# Patient Record
Sex: FEMALE | Race: WHITE | Employment: OTHER | URBAN - METROPOLITAN AREA
[De-identification: names, ages, dates, MRNs, and addresses within clinical notes are randomized per-mention and may not be internally consistent; named-entity substitution may affect disease eponyms.]

---

## 2017-08-29 PROBLEM — E66.01 SEVERE OBESITY (BMI 35.0-39.9) WITH COMORBIDITY (HCC): Status: ACTIVE | Noted: 2017-08-29

## 2017-08-29 PROBLEM — I70.0 AORTIC ATHEROSCLEROSIS (HCC): Status: ACTIVE | Noted: 2017-08-29

## 2017-10-10 PROCEDURE — 81001 URINALYSIS AUTO W/SCOPE: CPT | Performed by: FAMILY MEDICINE

## 2017-10-18 PROBLEM — M17.11 PRIMARY OSTEOARTHRITIS OF RIGHT KNEE: Status: ACTIVE | Noted: 2017-10-18

## 2018-02-23 PROBLEM — M94.261 CHONDROMALACIA OF RIGHT KNEE: Status: ACTIVE | Noted: 2018-02-23

## 2018-02-23 PROBLEM — M23.91 INTERNAL DERANGEMENT OF KNEE, RIGHT: Status: ACTIVE | Noted: 2018-02-23

## 2018-04-16 PROBLEM — N95.2 ATROPHIC VAGINITIS: Status: ACTIVE | Noted: 2018-04-16

## 2018-04-16 PROCEDURE — 87510 GARDNER VAG DNA DIR PROBE: CPT | Performed by: FAMILY MEDICINE

## 2018-04-16 PROCEDURE — 87480 CANDIDA DNA DIR PROBE: CPT | Performed by: FAMILY MEDICINE

## 2018-04-16 PROCEDURE — 87660 TRICHOMONAS VAGIN DIR PROBE: CPT | Performed by: FAMILY MEDICINE

## 2018-04-23 PROBLEM — N95.0 POSTMENOPAUSE BLEEDING: Status: ACTIVE | Noted: 2018-04-23

## 2018-04-23 PROBLEM — R93.89 ENDOMETRIAL THICKENING ON ULTRASOUND: Status: ACTIVE | Noted: 2018-04-23

## 2018-05-15 PROCEDURE — 88305 TISSUE EXAM BY PATHOLOGIST: CPT | Performed by: OBSTETRICS & GYNECOLOGY

## 2018-05-15 PROCEDURE — 88175 CYTOPATH C/V AUTO FLUID REDO: CPT | Performed by: OBSTETRICS & GYNECOLOGY

## 2018-05-22 PROCEDURE — 88305 TISSUE EXAM BY PATHOLOGIST: CPT | Performed by: OBSTETRICS & GYNECOLOGY

## 2018-07-26 PROBLEM — R93.89 ENDOMETRIAL THICKENING ON ULTRASOUND: Status: RESOLVED | Noted: 2018-04-23 | Resolved: 2018-07-26

## 2018-08-03 PROCEDURE — 81001 URINALYSIS AUTO W/SCOPE: CPT | Performed by: FAMILY MEDICINE

## 2019-02-28 PROBLEM — D03.61 MELANOMA IN SITU OF RIGHT UPPER EXTREMITY (HCC): Status: ACTIVE | Noted: 2019-02-28

## 2019-03-12 PROCEDURE — 88305 TISSUE EXAM BY PATHOLOGIST: CPT | Performed by: SURGERY

## 2019-06-18 PROBLEM — M87.9 OSTEONECROSIS OF RIGHT KNEE REGION (HCC): Status: ACTIVE | Noted: 2019-06-18

## 2019-08-12 PROCEDURE — 88342 IMHCHEM/IMCYTCHM 1ST ANTB: CPT

## 2019-08-15 ENCOUNTER — APPOINTMENT (OUTPATIENT)
Dept: LAB | Age: 69
End: 2019-08-15
Attending: PHYSICIAN ASSISTANT
Payer: MEDICARE

## 2019-08-15 DIAGNOSIS — D48.5 NEOPLASM OF UNCERTAIN BEHAVIOR OF SKIN: ICD-10-CM

## 2019-09-11 PROCEDURE — 81003 URINALYSIS AUTO W/O SCOPE: CPT | Performed by: FAMILY MEDICINE

## 2019-09-16 ENCOUNTER — APPOINTMENT (OUTPATIENT)
Dept: LAB | Facility: HOSPITAL | Age: 69
End: 2019-09-16
Payer: MEDICARE

## 2019-09-16 ENCOUNTER — LABORATORY ENCOUNTER (OUTPATIENT)
Dept: LAB | Facility: HOSPITAL | Age: 69
End: 2019-09-16
Payer: MEDICARE

## 2019-09-16 ENCOUNTER — HOSPITAL ENCOUNTER (OUTPATIENT)
Dept: PHYSICAL THERAPY | Facility: HOSPITAL | Age: 69
Discharge: HOME | End: 2019-09-16
Payer: MEDICARE

## 2019-09-16 DIAGNOSIS — M17.11 PRIMARY OSTEOARTHRITIS OF RIGHT KNEE: ICD-10-CM

## 2019-09-16 LAB
ANTIBODY SCREEN: NEGATIVE
RH BLOOD TYPE: POSITIVE

## 2019-09-16 PROCEDURE — 93005 ELECTROCARDIOGRAM TRACING: CPT

## 2019-09-16 PROCEDURE — 87081 CULTURE SCREEN ONLY: CPT

## 2019-09-16 PROCEDURE — 93010 ELECTROCARDIOGRAM REPORT: CPT | Performed by: INTERNAL MEDICINE

## 2019-09-16 PROCEDURE — 86850 RBC ANTIBODY SCREEN: CPT

## 2019-09-16 PROCEDURE — 86901 BLOOD TYPING SEROLOGIC RH(D): CPT

## 2019-09-16 PROCEDURE — 86900 BLOOD TYPING SEROLOGIC ABO: CPT

## 2019-09-17 LAB
ATRIAL RATE: 71 BPM
P AXIS: 59 DEGREES
P-R INTERVAL: 172 MS
Q-T INTERVAL: 438 MS
QRS DURATION: 92 MS
QTC CALCULATION (BEZET): 475 MS
R AXIS: -17 DEGREES
T AXIS: 50 DEGREES
VENTRICULAR RATE: 71 BPM

## 2019-10-03 ENCOUNTER — ANESTHESIA (OUTPATIENT)
Dept: SURGERY | Facility: HOSPITAL | Age: 69
End: 2019-10-03
Payer: MEDICARE

## 2019-10-03 ENCOUNTER — ANESTHESIA EVENT (OUTPATIENT)
Dept: SURGERY | Facility: HOSPITAL | Age: 69
End: 2019-10-03
Payer: MEDICARE

## 2019-10-03 ENCOUNTER — APPOINTMENT (OUTPATIENT)
Dept: GENERAL RADIOLOGY | Facility: HOSPITAL | Age: 69
End: 2019-10-03
Attending: PHYSICIAN ASSISTANT
Payer: MEDICARE

## 2019-10-03 ENCOUNTER — HOSPITAL ENCOUNTER (OUTPATIENT)
Facility: HOSPITAL | Age: 69
Discharge: HOME HEALTH | End: 2019-10-04
Attending: ORTHOPAEDIC SURGERY | Admitting: ORTHOPAEDIC SURGERY
Payer: MEDICARE

## 2019-10-03 DIAGNOSIS — M17.11 PRIMARY OSTEOARTHRITIS OF RIGHT KNEE: Primary | ICD-10-CM

## 2019-10-03 PROCEDURE — 88305 TISSUE EXAM BY PATHOLOGIST: CPT | Performed by: ORTHOPAEDIC SURGERY

## 2019-10-03 PROCEDURE — 0SRC069 REPLACEMENT OF RIGHT KNEE JOINT WITH OXIDIZED ZIRCONIUM ON POLYETHYLENE SYNTHETIC SUBSTITUTE, CEMENTED, OPEN APPROACH: ICD-10-PCS | Performed by: ORTHOPAEDIC SURGERY

## 2019-10-03 PROCEDURE — 97116 GAIT TRAINING THERAPY: CPT

## 2019-10-03 PROCEDURE — 97161 PT EVAL LOW COMPLEX 20 MIN: CPT

## 2019-10-03 PROCEDURE — 88311 DECALCIFY TISSUE: CPT | Performed by: ORTHOPAEDIC SURGERY

## 2019-10-03 PROCEDURE — 76942 ECHO GUIDE FOR BIOPSY: CPT | Performed by: ORTHOPAEDIC SURGERY

## 2019-10-03 PROCEDURE — 73560 X-RAY EXAM OF KNEE 1 OR 2: CPT | Performed by: PHYSICIAN ASSISTANT

## 2019-10-03 DEVICE — ATTUNE KNEE SYSTEM TIBIAL INSERT ROTATING PLATFORM POSTERIOR STABILIZED 6 6MM AOX
Type: IMPLANTABLE DEVICE | Site: KNEE | Status: FUNCTIONAL
Brand: ATTUNE

## 2019-10-03 DEVICE — SMARTSET GHV GENTAMICIN HIGH VISCOSITY BONE CEMENT 40G
Type: IMPLANTABLE DEVICE | Site: KNEE | Status: FUNCTIONAL
Brand: SMARTSET

## 2019-10-03 DEVICE — ATTUNE PATELLA MEDIALIZED ANATOMIC 35MM CEMENTED AOX
Type: IMPLANTABLE DEVICE | Site: KNEE | Status: FUNCTIONAL
Brand: ATTUNE

## 2019-10-03 DEVICE — ATTUNE KNEE SYSTEM FEMORAL POSTERIOR STABILIZED NARROW SIZE 6N RIGHT CEMENTED
Type: IMPLANTABLE DEVICE | Site: KNEE | Status: FUNCTIONAL
Brand: ATTUNE

## 2019-10-03 DEVICE — ATTUNE KNEE SYSTEM TIBIAL BASE ROTATING PLATFORM SIZE 4 CEMENTED
Type: IMPLANTABLE DEVICE | Site: KNEE | Status: FUNCTIONAL
Brand: ATTUNE

## 2019-10-03 RX ORDER — HYDROMORPHONE HYDROCHLORIDE 1 MG/ML
0.2 INJECTION, SOLUTION INTRAMUSCULAR; INTRAVENOUS; SUBCUTANEOUS EVERY 2 HOUR PRN
Status: DISCONTINUED | OUTPATIENT
Start: 2019-10-03 | End: 2019-10-04

## 2019-10-03 RX ORDER — DIPHENHYDRAMINE HYDROCHLORIDE 50 MG/ML
25 INJECTION INTRAMUSCULAR; INTRAVENOUS ONCE AS NEEDED
Status: ACTIVE | OUTPATIENT
Start: 2019-10-03 | End: 2019-10-03

## 2019-10-03 RX ORDER — DIPHENHYDRAMINE HCL 25 MG
25 CAPSULE ORAL EVERY 4 HOURS PRN
Status: DISCONTINUED | OUTPATIENT
Start: 2019-10-03 | End: 2019-10-04

## 2019-10-03 RX ORDER — KETOROLAC TROMETHAMINE 15 MG/ML
15 INJECTION, SOLUTION INTRAMUSCULAR; INTRAVENOUS EVERY 6 HOURS
Status: COMPLETED | OUTPATIENT
Start: 2019-10-03 | End: 2019-10-04

## 2019-10-03 RX ORDER — ZOLPIDEM TARTRATE 5 MG/1
5 TABLET ORAL NIGHTLY PRN
Status: DISCONTINUED | OUTPATIENT
Start: 2019-10-03 | End: 2019-10-04

## 2019-10-03 RX ORDER — SODIUM PHOSPHATE, DIBASIC AND SODIUM PHOSPHATE, MONOBASIC 7; 19 G/133ML; G/133ML
1 ENEMA RECTAL ONCE AS NEEDED
Status: DISCONTINUED | OUTPATIENT
Start: 2019-10-03 | End: 2019-10-04

## 2019-10-03 RX ORDER — NALOXONE HYDROCHLORIDE 0.4 MG/ML
80 INJECTION, SOLUTION INTRAMUSCULAR; INTRAVENOUS; SUBCUTANEOUS AS NEEDED
Status: DISCONTINUED | OUTPATIENT
Start: 2019-10-03 | End: 2019-10-03 | Stop reason: HOSPADM

## 2019-10-03 RX ORDER — PROCHLORPERAZINE EDISYLATE 5 MG/ML
10 INJECTION INTRAMUSCULAR; INTRAVENOUS EVERY 6 HOURS PRN
Status: DISCONTINUED | OUTPATIENT
Start: 2019-10-03 | End: 2019-10-04

## 2019-10-03 RX ORDER — TIZANIDINE 2 MG/1
2 TABLET ORAL 3 TIMES DAILY PRN
Status: DISCONTINUED | OUTPATIENT
Start: 2019-10-03 | End: 2019-10-04

## 2019-10-03 RX ORDER — ACETAMINOPHEN 500 MG
1000 TABLET ORAL ONCE
Status: DISCONTINUED | OUTPATIENT
Start: 2019-10-03 | End: 2019-10-03 | Stop reason: HOSPADM

## 2019-10-03 RX ORDER — HYDROMORPHONE HYDROCHLORIDE 1 MG/ML
0.4 INJECTION, SOLUTION INTRAMUSCULAR; INTRAVENOUS; SUBCUTANEOUS EVERY 2 HOUR PRN
Status: DISCONTINUED | OUTPATIENT
Start: 2019-10-03 | End: 2019-10-04

## 2019-10-03 RX ORDER — ACETAMINOPHEN 325 MG/1
650 TABLET ORAL 4 TIMES DAILY
Status: DISCONTINUED | OUTPATIENT
Start: 2019-10-03 | End: 2019-10-04

## 2019-10-03 RX ORDER — HYDROMORPHONE HYDROCHLORIDE 1 MG/ML
0.4 INJECTION, SOLUTION INTRAMUSCULAR; INTRAVENOUS; SUBCUTANEOUS EVERY 5 MIN PRN
Status: DISCONTINUED | OUTPATIENT
Start: 2019-10-03 | End: 2019-10-03 | Stop reason: HOSPADM

## 2019-10-03 RX ORDER — BISACODYL 10 MG
10 SUPPOSITORY, RECTAL RECTAL
Status: DISCONTINUED | OUTPATIENT
Start: 2019-10-03 | End: 2019-10-04

## 2019-10-03 RX ORDER — ONDANSETRON 2 MG/ML
INJECTION INTRAMUSCULAR; INTRAVENOUS
Status: COMPLETED
Start: 2019-10-03 | End: 2019-10-03

## 2019-10-03 RX ORDER — ONDANSETRON 2 MG/ML
4 INJECTION INTRAMUSCULAR; INTRAVENOUS EVERY 4 HOURS PRN
Status: DISCONTINUED | OUTPATIENT
Start: 2019-10-03 | End: 2019-10-04

## 2019-10-03 RX ORDER — MELATONIN
325
Status: DISCONTINUED | OUTPATIENT
Start: 2019-10-04 | End: 2019-10-04

## 2019-10-03 RX ORDER — METOCLOPRAMIDE HYDROCHLORIDE 5 MG/ML
10 INJECTION INTRAMUSCULAR; INTRAVENOUS EVERY 6 HOURS PRN
Status: DISCONTINUED | OUTPATIENT
Start: 2019-10-03 | End: 2019-10-04

## 2019-10-03 RX ORDER — ATORVASTATIN CALCIUM 10 MG/1
10 TABLET, FILM COATED ORAL NIGHTLY
Status: DISCONTINUED | OUTPATIENT
Start: 2019-10-03 | End: 2019-10-04

## 2019-10-03 RX ORDER — SODIUM CHLORIDE, SODIUM LACTATE, POTASSIUM CHLORIDE, CALCIUM CHLORIDE 600; 310; 30; 20 MG/100ML; MG/100ML; MG/100ML; MG/100ML
INJECTION, SOLUTION INTRAVENOUS CONTINUOUS
Status: DISCONTINUED | OUTPATIENT
Start: 2019-10-03 | End: 2019-10-03

## 2019-10-03 RX ORDER — DIPHENHYDRAMINE HYDROCHLORIDE 50 MG/ML
12.5 INJECTION INTRAMUSCULAR; INTRAVENOUS EVERY 4 HOURS PRN
Status: DISCONTINUED | OUTPATIENT
Start: 2019-10-03 | End: 2019-10-04

## 2019-10-03 RX ORDER — ACETAMINOPHEN 325 MG/1
TABLET ORAL
Status: COMPLETED
Start: 2019-10-03 | End: 2019-10-03

## 2019-10-03 RX ORDER — POLYETHYLENE GLYCOL 3350 17 G/17G
17 POWDER, FOR SOLUTION ORAL DAILY PRN
Status: DISCONTINUED | OUTPATIENT
Start: 2019-10-03 | End: 2019-10-04

## 2019-10-03 RX ORDER — SODIUM CHLORIDE, SODIUM LACTATE, POTASSIUM CHLORIDE, CALCIUM CHLORIDE 600; 310; 30; 20 MG/100ML; MG/100ML; MG/100ML; MG/100ML
INJECTION, SOLUTION INTRAVENOUS CONTINUOUS
Status: DISCONTINUED | OUTPATIENT
Start: 2019-10-03 | End: 2019-10-03 | Stop reason: HOSPADM

## 2019-10-03 RX ORDER — OXYCODONE HYDROCHLORIDE 5 MG/1
5 TABLET ORAL EVERY 4 HOURS PRN
Status: DISCONTINUED | OUTPATIENT
Start: 2019-10-03 | End: 2019-10-04

## 2019-10-03 RX ORDER — HYDROMORPHONE HYDROCHLORIDE 1 MG/ML
0.8 INJECTION, SOLUTION INTRAMUSCULAR; INTRAVENOUS; SUBCUTANEOUS EVERY 2 HOUR PRN
Status: DISCONTINUED | OUTPATIENT
Start: 2019-10-03 | End: 2019-10-04

## 2019-10-03 RX ORDER — CLINDAMYCIN PHOSPHATE 900 MG/50ML
900 INJECTION INTRAVENOUS ONCE
Status: COMPLETED | OUTPATIENT
Start: 2019-10-03 | End: 2019-10-04

## 2019-10-03 RX ORDER — OXYCODONE HYDROCHLORIDE 15 MG/1
15 TABLET ORAL EVERY 4 HOURS PRN
Status: DISCONTINUED | OUTPATIENT
Start: 2019-10-03 | End: 2019-10-04

## 2019-10-03 RX ORDER — CLINDAMYCIN PHOSPHATE 900 MG/50ML
900 INJECTION INTRAVENOUS EVERY 8 HOURS
Status: COMPLETED | OUTPATIENT
Start: 2019-10-03 | End: 2019-10-04

## 2019-10-03 RX ORDER — ACETAMINOPHEN 325 MG/1
650 TABLET ORAL ONCE
Status: COMPLETED | OUTPATIENT
Start: 2019-10-03 | End: 2019-10-03

## 2019-10-03 RX ORDER — TRAMADOL HYDROCHLORIDE 50 MG/1
50 TABLET ORAL EVERY 12 HOURS
Status: DISCONTINUED | OUTPATIENT
Start: 2019-10-03 | End: 2019-10-04

## 2019-10-03 RX ORDER — OXYCODONE HYDROCHLORIDE 10 MG/1
10 TABLET ORAL EVERY 4 HOURS PRN
Status: DISCONTINUED | OUTPATIENT
Start: 2019-10-03 | End: 2019-10-04

## 2019-10-03 RX ORDER — LISINOPRIL AND HYDROCHLOROTHIAZIDE 12.5; 1 MG/1; MG/1
1 TABLET ORAL DAILY
Status: DISCONTINUED | OUTPATIENT
Start: 2019-10-04 | End: 2019-10-03 | Stop reason: SDUPTHER

## 2019-10-03 RX ORDER — DOCUSATE SODIUM 100 MG/1
100 CAPSULE, LIQUID FILLED ORAL 2 TIMES DAILY
Status: DISCONTINUED | OUTPATIENT
Start: 2019-10-03 | End: 2019-10-04

## 2019-10-03 RX ORDER — SODIUM CHLORIDE 9 MG/ML
INJECTION, SOLUTION INTRAVENOUS CONTINUOUS
Status: DISCONTINUED | OUTPATIENT
Start: 2019-10-03 | End: 2019-10-04

## 2019-10-03 RX ORDER — SENNOSIDES 8.6 MG
17.2 TABLET ORAL NIGHTLY
Status: DISCONTINUED | OUTPATIENT
Start: 2019-10-03 | End: 2019-10-04

## 2019-10-03 RX ORDER — ONDANSETRON 2 MG/ML
4 INJECTION INTRAMUSCULAR; INTRAVENOUS AS NEEDED
Status: DISCONTINUED | OUTPATIENT
Start: 2019-10-03 | End: 2019-10-03 | Stop reason: HOSPADM

## 2019-10-03 NOTE — ANESTHESIA PREPROCEDURE EVALUATION
PRE-OP EVALUATION    Patient Name: Kristine Morrison    Pre-op Diagnosis: Primary osteoarthritis of right knee [M17.11]    Procedure(s):  RIGHT TOTAL KNEE ARTHROPLASTY    Surgeon(s) and Role:     Mica Elizabeth MD - Primary    Pre-op vitals reviewed.   Floresita Whitehead reviewed. (+) hypertension   (+) hyperlipidemia                                  Endo/Other    Negative endo/other ROS.                               Pulmonary  Comment: Histoplasmosis, pulmonary nodules                         Neuro/Psych    Neg history. Pulmonary    Pulmonary exam normal.  Breath sounds clear to auscultation bilaterally.                Other findings            ASA: 2   Plan: spinal and regional  NPO status verified and     Post-procedure pain management plan discussed wit

## 2019-10-03 NOTE — INTERVAL H&P NOTE
Pre-op Diagnosis: Primary osteoarthritis of right knee [M17.11]    The above referenced H&P was reviewed by Cynthia Camacho MD on 10/3/2019, the patient was examined and no significant changes have occurred in the patient's condition since the H&P was per

## 2019-10-03 NOTE — PLAN OF CARE
Received pt from pacu at 1300. C/o nausea and pain. Reglan, oxy and IV dilaudid given as per prn orders. Relief obtained. Pt was able to eat small amt lunch and then slept. Currently working with therapy.   Pt and spouse verbalized understanding of POC

## 2019-10-03 NOTE — H&P
Estefani Braden   9/11/2019 9:30 AM   Office Visit   MRN:  [id number]   Description: 68year old female Provider: Cherelle Enamorado MD Department: [address] [place] [address]   Scanning Cover Sheet     Click to print Barcode Encounter Cover Sheet for scanning   Off HYDROcodone-acetaminophen (NORCO) 5-325 MG Oral Tab Take 1 tablet by mouth every 4 (four) hours as needed for Pain. May limit to bedtime use.  Disp: 20 tablet Rfl: 0   mupirocin 2 % External Ointment Apply to biopsy site BID prn Disp: 30 g Rfl: 0   fluorour   Performed by Lenora Multani MD at [address] [place]   10/2/09     EBCT score 0   • EXCISION OF ARM MASS Right 3/12/2019     Performed by Janalee Fleischer, MD at [address]   200 LUNGS: denies shortness of breath with exertion  CARDIOVASCULAR: denies chest pain on exertion  GI: denies abdominal pain,denies heartburn  : denies dysuria, vaginal discharge or itching  MUSCULOSKELETAL: denies back pain  NEURO: denies headaches  PSYCHE Patient has a strong component of whitecoat effect in our office. She is reporting well controlled blood pressures at home. Parameters for holding her blood pressure medication were discussed with her.   If your surgery is in the afternoon, can take lisin 7.0 - 11.5 fL   10.5   Neutrophils Absolute      1.30 - 6.70 10ˆ3/µL   2.72   Lymphocytes Absolute      0.90 - 4.00 10ˆ3/µL   1.98   Monocytes Absolute      0.10 - 0.60 10ˆ3/µL   0.36   Eosinophils Absolute      0.00 - 0.30 10ˆ3/µL   0.15   Basophils A MSSA/MRSA Culture No MRSA or Staph aureus(MSSA) Isolated           Resulting Agency: [facility]          Specimen Collected: 09/16/19  5:19 PM Last Resulted: 09/17/19  3:54 PM         EKG 12-LEAD   Order: [id number]   Status:  Final result   Visible to pa

## 2019-10-03 NOTE — OPERATIVE REPORT
PATIENT'S NAME: Lamar Cano   ATTENDING PHYSICIAN: Halina Cheadle, MD   OPERATING PHYSICIAN: Halina Cheadle, MD   PATIENT ACCOUNT#:   [id number]    LOCATION:  [address] [place] [address] RECORD #:   [id number]    DATE OF BIRTH: removed until the joint was adequately visualized. Irrisept irrigation was placed in the wound and allowed to sit for 1 minute before evacuating the solution. The knee was then flexed and the drill used to gain intramedullary access to the femur.   The in followed by the punch, which was left in place for trialing. The femur was impacted in place, placing the trial polyethylene. The  knee was brought into full extension with good flexion and good ligament balance throughout.   Attention was turned to the p extensor mechanism. A #2 FiberWire suture was used in a figure-of-eight fashion at the medial parapatellar region to reinforce the repair. When the repair was complete, the knee was flexed to confirm stable repair.   Subcutaneous tissue was then closed wi

## 2019-10-03 NOTE — BRIEF OP NOTE
Pre-Operative Diagnosis: Primary osteoarthritis of right knee [M17.11]     Post-Operative Diagnosis: Primary osteoarthritis of right knee [M17.11]      Procedure Performed:   Procedure(s):  RIGHT TOTAL KNEE ARTHROPLASTY    Surgeon(s) and Role:     * Stacy

## 2019-10-03 NOTE — ANESTHESIA POSTPROCEDURE EVALUATION
[address] Patient Status:  Outpatient in a Bed   Age/Gender 68year old female MRN [id number]   [facility] SURGERY Attending Gela Saab MD   Hosp Day # 0 PCP Lucila Chavez MD       Anesthesia Post-op Note    Pro

## 2019-10-03 NOTE — PHYSICAL THERAPY NOTE
PHYSICAL THERAPY KNEE EVALUATION - INPATIENT     Room Number: 354/354-A  Evaluation Date: 10/3/2019  Type of Evaluation: Initial  Physician Order: PT Eval and Treat    Presenting Problem: s/p right TKA 10/3/19  Reason for Therapy: Mobility Dysfunction and APPROACH  2001   •       x4   • OTHER  2012    foot and arm   • REMOVAL GALLBLADDER     • REMOVAL OF OVARY(S)  2001    Right oopherectomy   • SKIN SURGERY         HOME SITUATION  Type of Home: House   Home Layout: Two level  Stairs to Enter : 1 How much help from another person does the patient currently need. ..   -   Moving to and from a bed to a chair (including a wheelchair)?: A Little   -   Need to walk in hospital room?: A Little   -   Climbing 3-5 steps with a railing?: A Little       AM- include none. In this PT evaluation, the patient presents with the following impairments right knee pain, dec right knee ROM and strength, dec activity tolerance.   The AM-PAC '6-Clicks' Inpatient Basic Mobility Short Form was completed and this patient is

## 2019-10-04 VITALS
WEIGHT: 204.81 LBS | DIASTOLIC BLOOD PRESSURE: 61 MMHG | OXYGEN SATURATION: 97 % | SYSTOLIC BLOOD PRESSURE: 130 MMHG | TEMPERATURE: 98 F | RESPIRATION RATE: 18 BRPM | HEIGHT: 62 IN | HEART RATE: 67 BPM | BODY MASS INDEX: 37.69 KG/M2

## 2019-10-04 PROBLEM — Z47.89 ORTHOPEDIC AFTERCARE: Status: ACTIVE | Noted: 2019-10-04

## 2019-10-04 PROCEDURE — 97535 SELF CARE MNGMENT TRAINING: CPT

## 2019-10-04 PROCEDURE — 97150 GROUP THERAPEUTIC PROCEDURES: CPT

## 2019-10-04 PROCEDURE — 96376 TX/PRO/DX INJ SAME DRUG ADON: CPT

## 2019-10-04 PROCEDURE — 97116 GAIT TRAINING THERAPY: CPT

## 2019-10-04 PROCEDURE — 97165 OT EVAL LOW COMPLEX 30 MIN: CPT

## 2019-10-04 PROCEDURE — 85027 COMPLETE CBC AUTOMATED: CPT | Performed by: PHYSICIAN ASSISTANT

## 2019-10-04 RX ORDER — HYDROCODONE BITARTRATE AND ACETAMINOPHEN 5; 325 MG/1; MG/1
1 TABLET ORAL EVERY 4 HOURS PRN
Status: DISCONTINUED | OUTPATIENT
Start: 2019-10-05 | End: 2019-10-04

## 2019-10-04 RX ORDER — HYDROCODONE BITARTRATE AND ACETAMINOPHEN 5; 325 MG/1; MG/1
2 TABLET ORAL EVERY 4 HOURS PRN
Status: DISCONTINUED | OUTPATIENT
Start: 2019-10-05 | End: 2019-10-04

## 2019-10-04 NOTE — PLAN OF CARE
A&Ox4. Pain has been mild. Ace wrap clean and intact. Ice therapy in place. Denies N/T. Ambulates with min assist and walker. VSS. SCDs on. Voiding without difficulty. Will continue to monitor.

## 2019-10-04 NOTE — OCCUPATIONAL THERAPY NOTE
OCCUPATIONAL THERAPY QUICK EVALUATION - INPATIENT    Room Number: 354/354-A  Evaluation Date: 10/4/2019     Type of Evaluation: Quick Eval  Presenting Problem: s/p R TKA 10/3/19    Physician Order: IP Consult to Occupational Therapy  Reason for Therapy:  A • MOHS - REFERRAL      left face   • MYOMECTOMY 5/> INTRAMURAL MYOMAS &/OR TOTAL WT >250 GMS, ABDOMINAL APPROACH  2001   •       x4   • OTHER  2012    foot and arm   • REMOVAL GALLBLADDER     • REMOVAL OF OVARY(S)      Right oopherectomy Putting on and taking off regular upper body clothing?: None  -   Taking care of personal grooming such as brushing teeth?: None  -   Eating meals?: None    AM-PAC Score:  Score: 21  Approx Degree of Impairment: 32.79%  Standardized Score (AM-PAC Scale): 4 Complexity  Occupational Profile/Medical History  LOW - Brief history including review of medical or therapy records    Specific performance deficits impacting engagement in ADL/IADL  LOW  1 - 3 performance deficits    Client Assessment/Performance Deficit

## 2019-10-04 NOTE — PROGRESS NOTES
Operative leg measured for tubigrip. Size F double layer tubigrip applied to right calf and size H single layer spandagrip applied to right knee. Patient and  instructed; verbalized understanding.

## 2019-10-04 NOTE — CM/SW NOTE
67 yo sp total knee replacement. Preop Joint Journey plan for pt to discharge home with [facility].        HOME SITUATION  Type of Home: House   Home Layout: Two level  Stairs to Enter : 1  Stairs to Bedroom: 13  Railing:  Yes     Lives With:

## 2019-10-04 NOTE — PLAN OF CARE
Has attended discharge class & viewed discharge instructions video. Verbal and written discharge instructions reviewed with patient and spouse. Verbalized understanding.

## 2019-10-04 NOTE — CONSULTS
General Medicine Consult      Reason for consult: post-op medical management     Consulted by: Dr. Tereza Cobb    PCP: Trey Sandy MD      History of Present Illness: Patient is a 68year old female with PMH sig for HTN, HL presented for elective R TKA.  Nahomi TOTAL WT >250 GMS, ABDOMINAL APPROACH  2001   •       x4   • OTHER  2012    foot and arm   • REMOVAL GALLBLADDER     • REMOVAL OF OVARY(S)      Right oopherectomy   • SKIN SURGERY          HOME MEDS    Outpatient Medications Marked as Taking fo oxyCODONE HCl **OR** oxyCODONE HCl, HYDROmorphone HCl **OR** HYDROmorphone HCl **OR** HYDROmorphone HCl       ALL:    Pcn [Bicillin C-R,]     ANAPHYLAXIS, TONGUE SWELLING    Comment:Difficulty breathing  Shellfish Allergy       RASH     Soc Hx:  Social His Latest Ref Rng & Units 9/11/2019   WBC      4.00 - 13.00 10:3/uL 5.23   RBC      3.80 - 5.10 10:6/uL 4.46   Hemoglobin      12.0 - 16.0 g/dL 13.9   Hematocrit      34.0 - 50.0 % 42.9   MCV      81.0 - 100.0 fL 96.2   MCH      27.0 - 33.2 pg 31.2   MCHC as expected.        ASSESSMENT / PLAN:    # s/p R TKA  - eliquis for DVT prophy per ortho  - PT/OT    # Post-op pain   - Transition to PO pain medications as able  - Bowel regimen    # HTN  - BP wnl  - resume ACE-HCTZ in AM and hold for SBP<130    # HL   -

## 2019-10-04 NOTE — PHYSICAL THERAPY NOTE
PHYSICAL THERAPY KNEE TREATMENT NOTE - INPATIENT     Room Number: 354/354-A     Session: 1&2   Number of Visits to Meet Established Goals: 3    Presenting Problem: s/p right TKA 10/3/19    Problem List  Active Problems:    * No active hospital problems.  * without pain\"     Patient’s self-stated goal is to be able to walk her dog     OBJECTIVE  Precautions: Limb alert - right    WEIGHT BEARING STATUS  Weight Bearing Restriction: R lower extremity        R Lower Extremity: Weight Bearing as Tolerated       P of achieving good ROM in a timely fashion explained. PM : Pt participated in seated and standing therex per TKA protocol c increased reps.  Pt participated in stair training, and car/tub t/f training c cues for sequencing and CGA  Assist. Pt performed s supervision   Met     Goal #3     Patient is able to ambulate 150 feet with assistive device at assistance level: modified  independent progressing- met at supervision     Goal #4     Patient will negotiate 4 stairs/one curb w/ assistive device and supervi

## 2019-10-04 NOTE — PLAN OF CARE
pain well controlled on oral pain medication. Right knee dressing clean, dry, intact. Cap refill ble < 3 seconds. Denies numbness, tingling. Up with stand by asst of 1. Gait steady.  wants to go home this evening.    has her prescri

## 2019-10-04 NOTE — PROGRESS NOTES
[facility] Hospitalist Progress Note     [facility] Patient Status:  Outpatient in a Bed    1950 MRN [id number]   [facility] 3SW-A Attending Guanako Patel MD   Hosp Day # 0 PCP Iris Loomis MD     CC: follow up    SUBJECTIVE:  No C regimen     # HTN  - BP wnl  - cont OP ACE-HCTZ      # HL   - cont statin    Postop hgb noted and within expected range with dilutional component. Prophylaxis:   DVT with eliquis    Dispo: pt doing well. Ok to Tissue Regenix from hospitalist standpoint.  Dc med rec

## 2019-10-04 NOTE — PROGRESS NOTES
[facility]  Progress Note    Unknown Aquas Patient Status:  Outpatient in a Bed    1950 MRN [id number]   [facility] 3SW-A Attending Denae Delong MD   Hosp Day # 0 PCP Marlo Marin MD     SUBJECTIVE:  INTERVAL HISTORY: S/P this evening if pain controlled and PT goes very well. 5. Continue medical management  6.  Follow up in office with Lizzy Orona MD in 2 weeks      Mirella Baca PA-C  10/4/2019  8:05 AM

## 2019-10-04 NOTE — HOME CARE LIAISON
MET WITH PTNT AND OFFERED CHOICE  OF AGENCIES. PTNT AGREEABLE TO [facility]. MET WITH PTNT TO DISCUSS HOME HEALTH SERVICES AND COVERAGE CRITERIA. PTNT AGREEABLE TO [facility]. PTNT GIVEN RESIDENTIAL BROCHURE.  RESIDENTIAL WITH PROVIDE SN/PT ON DISC

## 2019-10-06 NOTE — CM/SW NOTE
10/06/19 0800   Discharge disposition   Expected discharge disposition Home-Health   Name of Facillity/Home Care/Hospice [facility]idential   Discharge transportation Private car   DC 10/4/19

## 2019-11-12 PROBLEM — Z96.651 STATUS POST RIGHT KNEE REPLACEMENT: Status: ACTIVE | Noted: 2019-11-12

## 2020-02-10 PROBLEM — M87.9 OSTEONECROSIS OF RIGHT KNEE REGION (HCC): Status: RESOLVED | Noted: 2019-06-18 | Resolved: 2020-02-10

## 2020-03-24 PROBLEM — M23.91 INTERNAL DERANGEMENT OF KNEE, RIGHT: Status: RESOLVED | Noted: 2018-02-23 | Resolved: 2020-03-24

## 2020-03-24 PROBLEM — M17.11 PRIMARY OSTEOARTHRITIS OF RIGHT KNEE: Status: RESOLVED | Noted: 2017-10-18 | Resolved: 2020-03-24

## 2020-03-24 PROBLEM — N95.0 POSTMENOPAUSE BLEEDING: Status: RESOLVED | Noted: 2018-04-23 | Resolved: 2020-03-24

## 2020-03-24 PROBLEM — N95.2 ATROPHIC VAGINITIS: Status: RESOLVED | Noted: 2018-04-16 | Resolved: 2020-03-24

## 2020-03-24 PROBLEM — M94.261 CHONDROMALACIA OF RIGHT KNEE: Status: RESOLVED | Noted: 2018-02-23 | Resolved: 2020-03-24

## 2020-03-24 PROBLEM — D03.61 MELANOMA IN SITU OF RIGHT UPPER EXTREMITY (HCC): Status: RESOLVED | Noted: 2019-02-28 | Resolved: 2020-03-24

## 2020-06-02 PROBLEM — Z96.651 STATUS POST RIGHT KNEE REPLACEMENT: Status: RESOLVED | Noted: 2019-11-12 | Resolved: 2020-06-02

## 2020-06-02 PROBLEM — Z85.820 HISTORY OF MALIGNANT MELANOMA: Status: ACTIVE | Noted: 2020-06-02

## (undated) DEVICE — PADDING CAST COTTON  4

## (undated) DEVICE — HOOD, PEEL-AWAY: Brand: FLYTE

## (undated) DEVICE — Device: Brand: STABLECUT®

## (undated) DEVICE — SUTURE FIBERWIRE 2 AR-7202

## (undated) DEVICE — 2T11 #2 PDO 36 X 36: Brand: 2T11 #2 PDO 36 X 36

## (undated) DEVICE — CHLORAPREP 26ML APPLICATOR

## (undated) DEVICE — DRAPE,U/SHT,SPLIT,FILM,60X84,STERILE: Brand: MEDLINE

## (undated) DEVICE — ZIMMER® STERILE DISPOSABLE TOURNIQUET CUFF WITH PLC, DUAL PORT, SINGLE BLADDER, 34 IN. (86 CM)

## (undated) DEVICE — STERILE POLYISOPRENE POWDER-FREE SURGICAL GLOVES: Brand: PROTEXIS

## (undated) DEVICE — GAMMEX® PI HYBRID SIZE 8.5, STERILE POWDER-FREE SURGICAL GLOVE, POLYISOPRENE AND NEOPRENE BLEND: Brand: GAMMEX

## (undated) DEVICE — SPECIMEN CONTAINER,POSITIVE SEAL INDICATOR, OR PACKAGED: Brand: PRECISION

## (undated) DEVICE — KENDALL SCD EXPRESS SLEEVES, KNEE LENGTH, MEDIUM: Brand: KENDALL SCD

## (undated) DEVICE — STERILE PATIENT PROTECTIVE PAD FOR IMP® KNEE POSITIONERS & COHESIVE WRAP (10 / CASE): Brand: DE MAYO KNEE POSITIONER®

## (undated) DEVICE — CHLORAPREP ORANGE TINT 10.5ML

## (undated) DEVICE — SUTURE VICRYL 2-0 CP-1

## (undated) DEVICE — SOL  .9 1000ML BTL

## (undated) DEVICE — DECANTER BAG 9": Brand: MEDLINE INDUSTRIES, INC.

## (undated) DEVICE — BOWL CEMENT MIX QUICK-VAC

## (undated) DEVICE — SIGMA LCS HIGH PERFORMANCE INSTRUMENTS STERILE THREADED PINS: Brand: SIGMA LCS HIGH PERFORMANCE

## (undated) DEVICE — Device

## (undated) DEVICE — CONVERTORS STOCKINETTE: Brand: CONVERTORS

## (undated) DEVICE — GOWN,SIRUS,FABRIC-REINFORCED,X-LARGE: Brand: MEDLINE

## (undated) DEVICE — WRAP COOLING KNEE W/ICE PILLOW

## (undated) DEVICE — GAMMEX® NON-LATEX PI ORTHO SIZE 8, STERILE POLYISOPRENE POWDER-FREE SURGICAL GLOVE: Brand: GAMMEX

## (undated) DEVICE — SIGMA LCS HIGH PERFORMANCE STERILE THREADED HEADED PINS: Brand: SIGMA LCS HIGH PERFORMANCE

## (undated) DEVICE — DRESSING AQUACEL AG 3.5X12

## (undated) DEVICE — TOTAL KNEE CDS: Brand: MEDLINE INDUSTRIES, INC.

## (undated) DEVICE — 450 ML BOTTLE OF 0.05% CHLORHEXIDINE GLUCONATE IN 99.95% STERILE WATER FOR IRRIGATION, USP AND APPLICATOR.: Brand: IRRISEPT ANTIMICROBIAL WOUND LAVAGE

## (undated) DEVICE — SIGMA LCS HIGH PERFORMANCE INSTRUMENTS STERILE THREADED PINS
Type: IMPLANTABLE DEVICE | Site: KNEE
Brand: SIGMA LCS HIGH PERFORMANCE

## (undated) NOTE — IP AVS SNAPSHOT
Patient Demographics     Address  [address] [place] [address] Phone  [phone] [facility]) *Preferred*  [phone] [facility]) E-mail Address  [email or web address]      Emergency Contact(s)     Name Relation Home Work [address] [address] ? Usually allowed after four to six weeks. Discuss specific work activities with your surgeon. Restrictions  ? For knee replacement surgery, follow instructions provided by physical therapy.   ? Do NOT put a pillow under your knee as it may be more dif physician. ? Review anticoagulant education information sheet provided. Discomfort  ? Surgical discomfort is normal for one to two months. ? Have realistic goals and keep a positive outlook. ?  You may need pain medication regularly (every 4-6 hours) ? An enema or suppository may be needed if above measures do not work. Prevention of infection and promotion of healing  ? Good hand washing is important.  Everyone should wash their hands or use hand  as soon as they walk in your house-whether ? Red streaks on skin near incision. ? Temperature >100.4F.  ? Increased pain at incision not relieved by pain medication. Signs of blood clot  ? Pain, excessive tenderness, redness, or swelling in leg or calf (other than incision site).   (CALL View knee discharge education video at [email or web address]. Choose after surgery info. View Dr. Barney Majano discharge education video at:  [email or web address]     Tubigrip (compression sleeve) for KNEES    ?  All patients should wear Specialty:  Family Medicine  Contact information:  [address]zo Jarvis [address]             Yaakov Domínguez MD In 2 weeks.     Specialty:  SURGERY, ORTHOPEDIC  Contact information:  [address] [facility] Take 1 tablet by mouth daily.    Selena Abbasi MD         mupirocin 2 % Oint  Commonly known as:  BACTROBAN      Apply to biopsy site BID prn   BRENDA Jose         oxyCODONE HCl 5 MG Caps  Commonly known as:  OXY-IR      Take 1 capsule (5 mg total) [id number] ferrous sulfate EC tab 325 mg 10/04/19 0800 Given      [id number] lisinopril (PRINIVIL,ZESTRIL) 10 mg, hydrochlorothiazide (MICROZIDE) 12.5 mg for Zestoretic 10-12.5 ([facility] only) 10/04/19 0805 Given      [id number] oxyCODONE HCl (OXY-IR) cap/tab 5 m Author:  Robbi Sacks, MD Service:  Orthopedics Author Type:  Physician    Filed:  10/3/2019  8:52 AM Date of Service:  10/3/2019  8:52 AM Status:  Signed    :  Robbi Sacks, MD (Physician)       Renetta Combs   9/11/2019 9:30 AM   Office Pravastatin Sodium (PRAVACHOL) 40 MG Oral Tab Take 1 tablet (40 mg total) by mouth daily. Disp: 90 tablet Rfl: 3   Lisinopril-hydroCHLOROthiazide 10-12.5 MG Oral Tab Take 1 tablet by mouth daily.  Disp: 90 tablet Rfl: 3   HYDROcodone-acetaminophen (NORCO) 5   normal, recheck in 10 years, Dr. Candido Cunningham   • COLONOSCOPY   3/2015     early diverticulosis.  repeat 2025   • COLONOSCOPY, POSSIBLE BIOPSY, POSSIBLE POLYPECTOMY 12109 N/A 3/5/2015     Performed by Lucia Cruz MD at [facility] GENERAL: feels well otherwise  SKIN: As above  EYES:denies blurred vision or double vision  HEENT: denies nasal congestion, sinus pain or ST  LUNGS: denies shortness of breath with exertion  CARDIOVASCULAR: denies chest pain on exertion  GI: denies abdomin -     Lisinopril-hydroCHLOROthiazide 10-12.5 MG Oral Tab; Take 1 tablet by mouth daily. Patient has a strong component of whitecoat effect in our office. She is reporting well controlled blood pressures at home.   Parameters for holding her blood pressure 150 - 450 10:3/uL   304   RDW      11.5 - 16.0 %   12.7   MEAN PLATELET VOLUME      7.0 - 11.5 fL   10.5   Neutrophils Absolute      1.30 - 6.70 10ˆ3/µL   2.72   Lymphocytes Absolute      0.90 - 4.00 10ˆ3/µL   1.98   Monocytes Absolute      0.10 - 0.60 1.60 - 2.60 mg/dL   2.00      MSSA/MRSA Culture No MRSA or Staph aureus(MSSA) Isolated           Resulting Agency: [facility]          Specimen Collected: 09/16/19  5:19 PM Last Resulted: 09/17/19  3:54 PM         EKG 12-LEAD   Order: [id number]   [facility] General Medicine Consult      Reason for consult: post-op medical management     Consulted by: Dr. Brenton Vyas    PCP: Lucila Chavez MD      History of Present Illness: Patient is a 68year old female with PMH sig for HTN, HL presented for elective R TKA.  Nahomi • MYOMECTOMY 5/> INTRAMURAL MYOMAS &/OR TOTAL WT >250 GMS, ABDOMINAL APPROACH     •       x4   • OTHER  2012    foot and arm   • REMOVAL GALLBLADDER     • REMOVAL OF OVARY(S)      Right oopherectomy   • SKIN SURGERY          HOME MEDS    Ou diphenhydrAMINE HCl, tiZANidine HCl, oxyCODONE HCl **OR** oxyCODONE HCl **OR** oxyCODONE HCl, HYDROmorphone HCl **OR** HYDROmorphone HCl **OR** HYDROmorphone HCl       ALL:    Pcn [Bicillin C-R,]     ANAPHYLAXIS, TONGUE SWELLING    Comment:Difficulty breat Skin: no visible rashes      Diagnostics:   CBC/Chem  Component      Latest Ref Rng & Units 9/11/2019   WBC      4.00 - 13.00 10:3/uL 5.23   RBC      3.80 - 5.10 10:6/uL 4.46   Hemoglobin      12.0 - 16.0 g/dL 13.9   Hematocrit      34.0 - 50.0 % 42.9   MC Approved by: Lorri Holliday MD          Postop R knee xray independently reviewed: hardware noted as expected.        ASSESSMENT / PLAN:    # s/p R TKA  - eliquis for DVT prophy per ortho  - PT/OT    # Post-op pain   - Transition to PO pain medications • Fatty liver 9/16/2010   • HEADACHES    • HEMORRHOIDS    • High blood pressure    • High cholesterol    • Histoplasmosis     followed by Dr. Latanya Francis   • Hx of basal cell carcinoma    • Hypercholesterolemia    • Hyperlipidemia LDL goal < 130 dx in 2000   • M O2 WALK                  AM-PAC '6-Clicks' INPATIENT SHORT FORM - BASIC MOBILITY  How much difficulty does the patient currently have. ..  -   Turning over in bed (including adjusting bedclothes, sheets and blankets)?: None   -   Sitting down on and stand Quad Sets[CY.1] 10[CY. 2] reps[CY.1] 15[CY. 2] reps   Glut Sets[CY.1] 10[CY. 2] reps[CY.1] 15[CY. 2] reps   Hip Abd/Add[CY.1] 10[CY. 2] reps[CY.1] 15[CY. 2] reps   Heel slides[CY.1] 10[CY. 2] reps[CY.1] 15[CY. 2] reps   Saq[CY.1] 10[CY. 2] reps[CY.1] 15[CY. 2] reps Patient will negotiate 4 stairs/one curb w/ assistive device and supervision  Progressing     Goal #5     AROM 0 degrees extension to 95 degrees flexion    Progressing     Goal #6           Goal Comments: Goals established on 10/3/2019[CY. 2]       Attri • SEASONAL ALLERGIES    • Visual impairment     glasses       Past Surgical History  Past Surgical History:   Procedure Laterality Date   • CHOLECYSTECTOMY     • COLONOSCOPY  4/2/04    normal, recheck in 10 years, Dr. Karen Weber   • COLONOSCOPY  3/2015    lor Lower extremity strength is within functional limits except right knee    BALANCE  Static Sitting: Good  Dynamic Sitting: Good  Static Standing: Poor +  Dynamic Standing: Poor +                                                                       ADDITION gait sequencing. Pt amb with rw with gait training emphasis on heel toe pattern, upright posture. Gait limited to restroom and back to chair due to pain. Pt returned to bs chair, educated about POC.   Pt appropriate for group PT in am.  Discussed session Frequency (Obs): BID  Number of Visits to Meet Established Goals: 3      CURRENT GOALS   Goal #1        Goal #2    Patient is able to demonstrate transfers Sit to/from Stand at assistance level: supervison    Goal #3    Patient is able to ambulate[SP.1] 15 • Essential hypertension    • Essential hypertension, benign dx in 2000   • Fatty liver 9/16/2010   • HEADACHES    • HEMORRHOIDS    • High blood pressure    • High cholesterol    • Histoplasmosis     followed by Dr. Lura Sandifer   • Hx of basal cell carcinoma \"I'm looking forward to taking my dog Patricia Quan for walks again\"    OBJECTIVE  Precautions: Limb alert - right  Fall Risk: Standard fall risk    WEIGHT BEARING RESTRICTION  Weight Bearing Restriction: R lower extremity        R Lower Extremity: Weight Mari deferred, reports does not wear at home typically), to waist SBA for balance in standing; UB dressing Mod I; education on toileting and toilet transfer techniques, performed transfer with standby assist to raised height toilet with light use of grab bar re services. Please re-order if a new functional limitation presents during this admission. Patient was able to achieve the following goals:  Patient able to toilet transfer:  At supervision level or above; patient reports will have supervision at home  [facility] THER[facility]Y - Ragini Diaz    10/24/2019 6:30 PM Rangel Faustin, PT [facility] PHYSICAL THERAPY - Ragini Diaz    10/28/2019 6:30 PM Jannette Viramontes, TOMMY [address] PHYSICAL THERAPY - Lesli Schultz    10/30/2019 6:30

## (undated) NOTE — IP AVS SNAPSHOT
[address]            (For Outpatient Use Only) Initial Admit Date: 10/3/2019   Inpt/Obs Admit Date: Inpt: N/A / Obs: N/A   Discharge Date:    Hospital Acct:  [de-identified]   MRN: [de-identified]   CSN: [id number]   CEID: [id number]        E Hospital Account Financial Class: Medicare Advantage    October 4, 2019